# Patient Record
Sex: MALE | Race: WHITE | NOT HISPANIC OR LATINO | Employment: STUDENT | URBAN - METROPOLITAN AREA
[De-identification: names, ages, dates, MRNs, and addresses within clinical notes are randomized per-mention and may not be internally consistent; named-entity substitution may affect disease eponyms.]

---
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
## 2022-06-23 ENCOUNTER — OFFICE VISIT (OUTPATIENT)
Dept: FAMILY MEDICINE CLINIC | Facility: CLINIC | Age: 22
End: 2022-06-23

## 2022-06-23 VITALS
DIASTOLIC BLOOD PRESSURE: 62 MMHG | HEIGHT: 73 IN | BODY MASS INDEX: 23.62 KG/M2 | TEMPERATURE: 97.5 F | HEART RATE: 95 BPM | OXYGEN SATURATION: 98 % | WEIGHT: 178.2 LBS | SYSTOLIC BLOOD PRESSURE: 110 MMHG

## 2022-06-23 DIAGNOSIS — R53.83 OTHER FATIGUE: Primary | ICD-10-CM

## 2022-06-23 DIAGNOSIS — J45.21 MILD INTERMITTENT ASTHMA WITH EXACERBATION: ICD-10-CM

## 2022-06-23 DIAGNOSIS — U07.1 COVID-19 VIRUS INFECTION: ICD-10-CM

## 2022-06-23 PROCEDURE — 99213 OFFICE O/P EST LOW 20 MIN: CPT | Performed by: FAMILY MEDICINE

## 2022-06-23 NOTE — PROGRESS NOTES
"Answers for HPI/ROS submitted by the patient on 6/16/2022  What is the primary reason for your visit?: Physical    Chief Complaint  Follow-up (On covid. Tested positive 060622 ) and Fatigue    Subjective        Kvng See presents to [facility]  History of Present Illness    COVID-19  The patient reports he had COVID-19 on 06/06/2022. He states this is his first time having COVID-19. He states he thought he had strep throat and then his symptoms worsened. The patient that states he is still really fatigued, and he falls asleep easily. He states he went to bed last night around 10:00 PM and woke up today around 12:50 PM. He states he still has a lot of mucus, and has to clear his throat often. The patient states that his wheezing has been intermittent. He states he just came from his mother's house and she has a dog, and states he did not do well since he has not been around the dog in a long time. He states that he does not sleep well at night. He states he is working 2 days a week. The patient states that on 06/22/2022, he noticed his throat was a little red.     He states that other than the mucous and fatigue, he feels fine. He states his mood is pretty good, but whenever he gets tired and does not feel good, he gets upset at everything. He states it is difficult to take a deep breath.    Objective   Vital Signs:  /62   Pulse 95   Temp 97.5 °F (36.4 °C)   Ht 185.4 cm (73\")   Wt 80.8 kg (178 lb 3.2 oz)   SpO2 98%   BMI 23.51 kg/m²   Estimated body mass index is 23.51 kg/m² as calculated from the following:    Height as of this encounter: 185.4 cm (73\").    Weight as of this encounter: 80.8 kg (178 lb 3.2 oz).    BMI is within normal parameters. No other follow-up for BMI required.      Physical Exam  Constitutional:       General: He is not in acute distress.     Appearance: He is well-developed.   HENT:      Head: Normocephalic and atraumatic.      Right Ear: " Tympanic membrane, ear canal and external ear normal.      Left Ear: Tympanic membrane, ear canal and external ear normal.      Nose: Nose normal.      Mouth/Throat:      Mouth: Mucous membranes are moist.      Pharynx: Oropharynx is clear. Posterior oropharyngeal erythema present.      Comments: Lungs sound fine. Oxygen level is normal.  Slight erythema of the back of the throat.  Eyes:      Conjunctiva/sclera: Conjunctivae normal.      Pupils: Pupils are equal, round, and reactive to light.   Cardiovascular:      Rate and Rhythm: Normal rate and regular rhythm.      Heart sounds: No murmur heard.  Pulmonary:      Effort: Pulmonary effort is normal. No respiratory distress.      Breath sounds: Normal breath sounds.   Musculoskeletal:      Cervical back: Neck supple.      Right lower leg: No edema.      Left lower leg: No edema.   Lymphadenopathy:      Cervical: No cervical adenopathy.   Neurological:      Mental Status: He is alert and oriented to person, place, and time.   Psychiatric:         Mood and Affect: Mood normal.         Behavior: Behavior normal.        Result Review :                Assessment and Plan   Diagnoses and all orders for this visit:    1. Other fatigue (Primary)  -     Comprehensive Metabolic Panel  -     CBC & Differential  -     TSH    2. Mild intermittent asthma with exacerbation    3. COVID-19 virus infection             Follow Up   No follow-ups on file.  Patient was given instructions and counseling regarding his condition or for health maintenance advice. Please see specific information pulled into the AVS if appropriate.     Transcribed from ambient dictation for Meredith Lea Kehrer, MD by Fidelia Tracy.  06/23/22   14:43 EDT    Patient verbalized consent to the visit recording.

## 2022-06-24 LAB
ALBUMIN SERPL-MCNC: 5 G/DL (ref 4.1–5.2)
ALBUMIN/GLOB SERPL: 2.4 {RATIO} (ref 1.2–2.2)
ALP SERPL-CCNC: 84 IU/L (ref 51–125)
ALT SERPL-CCNC: 17 IU/L (ref 0–44)
AST SERPL-CCNC: 13 IU/L (ref 0–40)
BASOPHILS # BLD AUTO: 0.1 X10E3/UL (ref 0–0.2)
BASOPHILS NFR BLD AUTO: 1 %
BILIRUB SERPL-MCNC: 0.6 MG/DL (ref 0–1.2)
BUN SERPL-MCNC: 11 MG/DL (ref 6–20)
BUN/CREAT SERPL: 13 (ref 9–20)
CALCIUM SERPL-MCNC: 10.2 MG/DL (ref 8.7–10.2)
CHLORIDE SERPL-SCNC: 104 MMOL/L (ref 96–106)
CO2 SERPL-SCNC: 24 MMOL/L (ref 20–29)
CREAT SERPL-MCNC: 0.88 MG/DL (ref 0.76–1.27)
EGFRCR SERPLBLD CKD-EPI 2021: 127 ML/MIN/1.73
EOSINOPHIL # BLD AUTO: 1.3 X10E3/UL (ref 0–0.4)
EOSINOPHIL NFR BLD AUTO: 18 %
ERYTHROCYTE [DISTWIDTH] IN BLOOD BY AUTOMATED COUNT: 12.3 % (ref 11.6–15.4)
GLOBULIN SER CALC-MCNC: 2.1 G/DL (ref 1.5–4.5)
GLUCOSE SERPL-MCNC: 100 MG/DL (ref 65–99)
HCT VFR BLD AUTO: 46.6 % (ref 37.5–51)
HGB BLD-MCNC: 16.3 G/DL (ref 13–17.7)
IMM GRANULOCYTES # BLD AUTO: 0 X10E3/UL (ref 0–0.1)
IMM GRANULOCYTES NFR BLD AUTO: 1 %
LYMPHOCYTES # BLD AUTO: 1.6 X10E3/UL (ref 0.7–3.1)
LYMPHOCYTES NFR BLD AUTO: 23 %
MCH RBC QN AUTO: 31.6 PG (ref 26.6–33)
MCHC RBC AUTO-ENTMCNC: 35 G/DL (ref 31.5–35.7)
MCV RBC AUTO: 90 FL (ref 79–97)
MONOCYTES # BLD AUTO: 0.5 X10E3/UL (ref 0.1–0.9)
MONOCYTES NFR BLD AUTO: 7 %
NEUTROPHILS # BLD AUTO: 3.6 X10E3/UL (ref 1.4–7)
NEUTROPHILS NFR BLD AUTO: 50 %
PLATELET # BLD AUTO: 307 X10E3/UL (ref 150–450)
POTASSIUM SERPL-SCNC: 4.4 MMOL/L (ref 3.5–5.2)
PROT SERPL-MCNC: 7.1 G/DL (ref 6–8.5)
RBC # BLD AUTO: 5.16 X10E6/UL (ref 4.14–5.8)
SODIUM SERPL-SCNC: 141 MMOL/L (ref 134–144)
TSH SERPL DL<=0.005 MIU/L-ACNC: 1.88 UIU/ML (ref 0.45–4.5)
WBC # BLD AUTO: 7.1 X10E3/UL (ref 3.4–10.8)

## 2022-08-01 DIAGNOSIS — J45.20 MILD INTERMITTENT ASTHMA, UNSPECIFIED WHETHER COMPLICATED: ICD-10-CM

## 2022-08-01 RX ORDER — ALBUTEROL SULFATE 90 UG/1
2 AEROSOL, METERED RESPIRATORY (INHALATION) EVERY 4 HOURS PRN
Qty: 18 G | Refills: 2 | Status: SHIPPED | OUTPATIENT
Start: 2022-08-01

## 2022-08-01 NOTE — TELEPHONE ENCOUNTER
Caller: Kvgn See    Relationship: Self    Best call back number: [phone]     Requested Prescriptions:   Requested Prescriptions     Pending Prescriptions Disp Refills   • albuterol sulfate  (90 Base) MCG/ACT inhaler 18 g 2     Sig: Inhale 2 puffs Every 4 (Four) Hours As Needed for Wheezing.        [facility] [address] [address] [facility] AT [address] [phone] [facility] [phone]      Additional details provided by patient:     Does the patient have less than a 3 day supply:  [x] Yes  [] No    Yoanna Stevens Rep   08/01/22 14:47 EDT

## 2022-11-22 ENCOUNTER — TRANSCRIBE ORDERS (OUTPATIENT)
Dept: ADMINISTRATIVE | Facility: HOSPITAL | Age: 22
End: 2022-11-22

## 2022-11-22 DIAGNOSIS — J45.909: Primary | ICD-10-CM

## 2022-12-13 ENCOUNTER — HOSPITAL ENCOUNTER (OUTPATIENT)
Dept: RESPIRATORY THERAPY | Facility: HOSPITAL | Age: 22
Discharge: HOME | End: 2022-12-13

## 2022-12-13 ENCOUNTER — TRANSCRIBE ORDERS (OUTPATIENT)
Dept: ADMINISTRATIVE | Facility: HOSPITAL | Age: 22
End: 2022-12-13

## 2022-12-13 ENCOUNTER — LAB (OUTPATIENT)
Dept: LAB | Facility: HOSPITAL | Age: 22
End: 2022-12-13

## 2022-12-13 ENCOUNTER — HOSPITAL ENCOUNTER (OUTPATIENT)
Dept: GENERAL RADIOLOGY | Facility: HOSPITAL | Age: 22
Discharge: HOME | End: 2022-12-13

## 2022-12-13 DIAGNOSIS — J45.909 ACUTE ASTHMA: ICD-10-CM

## 2022-12-13 DIAGNOSIS — J45.909 ASTHMA, UNSPECIFIED ASTHMA SEVERITY, UNSPECIFIED WHETHER COMPLICATED, UNSPECIFIED WHETHER PERSISTENT: ICD-10-CM

## 2022-12-13 DIAGNOSIS — J45.909 ACUTE ASTHMA: Primary | ICD-10-CM

## 2022-12-13 DIAGNOSIS — J45.909: ICD-10-CM

## 2022-12-13 PROCEDURE — 94729 DIFFUSING CAPACITY: CPT

## 2022-12-13 PROCEDURE — 71046 X-RAY EXAM CHEST 2 VIEWS: CPT

## 2022-12-13 PROCEDURE — 82785 ASSAY OF IGE: CPT

## 2022-12-13 PROCEDURE — 94060 EVALUATION OF WHEEZING: CPT

## 2022-12-13 PROCEDURE — 36415 COLL VENOUS BLD VENIPUNCTURE: CPT

## 2022-12-13 PROCEDURE — 94727 GAS DIL/WSHOT DETER LNG VOL: CPT

## 2022-12-13 RX ORDER — ALBUTEROL SULFATE 2.5 MG/3ML
2.5 SOLUTION RESPIRATORY (INHALATION) ONCE AS NEEDED
Status: COMPLETED | OUTPATIENT
Start: 2022-12-13 | End: 2022-12-13

## 2022-12-13 RX ADMIN — ALBUTEROL SULFATE 2.5 MG: 2.5 SOLUTION RESPIRATORY (INHALATION) at 09:12

## 2022-12-16 LAB — IGE SERPL-ACNC: 256 IU/ML (ref 6–495)

## 2023-01-19 ENCOUNTER — OFFICE VISIT (OUTPATIENT)
Dept: FAMILY MEDICINE CLINIC | Facility: CLINIC | Age: 23
End: 2023-01-19
Payer: COMMERCIAL

## 2023-01-19 VITALS
OXYGEN SATURATION: 98 % | BODY MASS INDEX: 25.42 KG/M2 | HEIGHT: 73 IN | HEART RATE: 74 BPM | DIASTOLIC BLOOD PRESSURE: 70 MMHG | SYSTOLIC BLOOD PRESSURE: 122 MMHG | WEIGHT: 191.8 LBS | TEMPERATURE: 97.3 F

## 2023-01-19 DIAGNOSIS — J45.20 MILD INTERMITTENT ASTHMA, UNSPECIFIED WHETHER COMPLICATED: ICD-10-CM

## 2023-01-19 DIAGNOSIS — F90.2 ATTENTION DEFICIT HYPERACTIVITY DISORDER (ADHD), COMBINED TYPE: Primary | ICD-10-CM

## 2023-01-19 DIAGNOSIS — Z23 FLU VACCINE NEED: ICD-10-CM

## 2023-01-19 PROCEDURE — 90686 IIV4 VACC NO PRSV 0.5 ML IM: CPT | Performed by: FAMILY MEDICINE

## 2023-01-19 PROCEDURE — 90471 IMMUNIZATION ADMIN: CPT | Performed by: FAMILY MEDICINE

## 2023-01-19 PROCEDURE — 99213 OFFICE O/P EST LOW 20 MIN: CPT | Performed by: FAMILY MEDICINE

## 2023-01-19 RX ORDER — ATOMOXETINE 80 MG/1
80 CAPSULE ORAL DAILY
Qty: 30 CAPSULE | Refills: 1 | Status: SHIPPED | OUTPATIENT
Start: 2023-01-19 | End: 2023-03-02 | Stop reason: SDUPTHER

## 2023-01-19 RX ORDER — ATOMOXETINE 40 MG/1
40 CAPSULE ORAL DAILY
Qty: 3 CAPSULE | Refills: 0 | Status: SHIPPED | OUTPATIENT
Start: 2023-01-19 | End: 2023-01-22
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
## 2023-03-02 ENCOUNTER — OFFICE VISIT (OUTPATIENT)
Dept: FAMILY MEDICINE CLINIC | Facility: CLINIC | Age: 23
End: 2023-03-02
Payer: COMMERCIAL

## 2023-03-02 VITALS
OXYGEN SATURATION: 98 % | HEIGHT: 73 IN | TEMPERATURE: 98 F | WEIGHT: 190.6 LBS | SYSTOLIC BLOOD PRESSURE: 114 MMHG | BODY MASS INDEX: 25.26 KG/M2 | HEART RATE: 72 BPM | DIASTOLIC BLOOD PRESSURE: 68 MMHG

## 2023-03-02 DIAGNOSIS — J30.1 SEASONAL ALLERGIC RHINITIS DUE TO POLLEN: Primary | ICD-10-CM

## 2023-03-02 DIAGNOSIS — F90.2 ATTENTION DEFICIT HYPERACTIVITY DISORDER (ADHD), COMBINED TYPE: ICD-10-CM

## 2023-03-02 PROCEDURE — 99213 OFFICE O/P EST LOW 20 MIN: CPT | Performed by: FAMILY MEDICINE

## 2023-03-02 RX ORDER — ATOMOXETINE 80 MG/1
80 CAPSULE ORAL DAILY
Qty: 90 CAPSULE | Refills: 1 | Status: SHIPPED | OUTPATIENT
Start: 2023-03-02

## 2023-03-02 NOTE — PROGRESS NOTES
"Chief Complaint  ADD (Follow up ) and Fatigue (Meds making him sleepy )    Subjective        Kvng See presents to [facility]  History of Present Illness  Patient presents for 1 month follow-up on Strattera.  He thought it made him sleepy but on further discussion we realized that his sleep cycle that is the issue.  If he goes to bed at his normal time and gets up normally he is fine.  He also needs to make sure he takes it first thing in the morning.  His father and stepmom mother have noticed an improvement.  On further consideration he realizes that he has been able to complete some projects.  He still lacks motivation to do things he does not want to do which is normal.    He helped his grandfather do some vesna yesterday in a house that had a dog and his allergies have flared some.  He has not had any problems with his asthma yet this spring.  He is compliant with his inhaler.        Objective   Vital Signs:  /68   Pulse 72   Temp 98 °F (36.7 °C)   Ht 185.4 cm (73\")   Wt 86.5 kg (190 lb 9.6 oz)   SpO2 98%   BMI 25.15 kg/m²   Estimated body mass index is 25.15 kg/m² as calculated from the following:    Height as of this encounter: 185.4 cm (73\").    Weight as of this encounter: 86.5 kg (190 lb 9.6 oz).  Facility age limit for growth percentiles is 20 years.          Physical Exam  Constitutional:       General: He is not in acute distress.     Appearance: He is well-developed.   HENT:      Head: Normocephalic and atraumatic.      Right Ear: Tympanic membrane, ear canal and external ear normal.      Left Ear: Tympanic membrane, ear canal and external ear normal.      Nose: Rhinorrhea present.      Mouth/Throat:      Mouth: Mucous membranes are moist.      Pharynx: Oropharynx is clear.      Comments: Clear postnasal drainage  Eyes:      Conjunctiva/sclera: Conjunctivae normal.      Pupils: Pupils are equal, round, and reactive to light.   Cardiovascular:      " Rate and Rhythm: Normal rate and regular rhythm.      Heart sounds: No murmur heard.  Pulmonary:      Effort: Pulmonary effort is normal. No respiratory distress.      Breath sounds: Normal breath sounds.   Musculoskeletal:      Cervical back: Neck supple.      Right lower leg: No edema.      Left lower leg: No edema.   Lymphadenopathy:      Cervical: No cervical adenopathy.   Neurological:      Mental Status: He is alert and oriented to person, place, and time.   Psychiatric:         Mood and Affect: Mood normal.         Behavior: Behavior normal.        Result Review :                   Assessment and Plan   Diagnoses and all orders for this visit:    1. Seasonal allergic rhinitis due to pollen (Primary)    2. Attention deficit hyperactivity disorder (ADHD), combined type  -     atomoxetine (Strattera) 80 MG capsule; Take 1 capsule by mouth Daily.  Dispense: 90 capsule; Refill: 1    Attention deficit disorder-improved on Strattera, continue current dose and follow-up in 3 months for physical  Allergic rhinitis-continue Zyrtec and consider doing a steroid nasal spray over-the-counter         Follow Up   Return in about 3 months (around 6/2/2023) for Annual physical.  Patient was given instructions and counseling regarding his condition or for health maintenance advice. Please see specific information pulled into the AVS if appropriate.

## 2023-04-12 ENCOUNTER — OFFICE VISIT (OUTPATIENT)
Dept: FAMILY MEDICINE CLINIC | Facility: CLINIC | Age: 23
End: 2023-04-12
Payer: COMMERCIAL

## 2023-04-12 VITALS
OXYGEN SATURATION: 99 % | TEMPERATURE: 97.5 F | HEIGHT: 73 IN | WEIGHT: 191 LBS | DIASTOLIC BLOOD PRESSURE: 58 MMHG | BODY MASS INDEX: 25.31 KG/M2 | HEART RATE: 91 BPM | SYSTOLIC BLOOD PRESSURE: 110 MMHG

## 2023-04-12 DIAGNOSIS — F90.2 ATTENTION DEFICIT HYPERACTIVITY DISORDER (ADHD), COMBINED TYPE: Primary | ICD-10-CM

## 2023-04-12 PROCEDURE — 99213 OFFICE O/P EST LOW 20 MIN: CPT | Performed by: FAMILY MEDICINE

## 2023-04-12 RX ORDER — FLUTICASONE PROPIONATE 100 MCG
BLISTER, WITH INHALATION DEVICE INHALATION
COMMUNITY
Start: 2023-03-02

## 2023-04-12 RX ORDER — BUPROPION HYDROCHLORIDE 300 MG/1
300 TABLET ORAL DAILY
Qty: 30 TABLET | Refills: 0 | Status: SHIPPED | OUTPATIENT
Start: 2023-04-12

## 2023-04-12 RX ORDER — BUPROPION HYDROCHLORIDE 150 MG/1
150 TABLET ORAL DAILY
Qty: 7 TABLET | Refills: 0 | Status: SHIPPED | OUTPATIENT
Start: 2023-04-12 | End: 2023-04-19

## 2023-04-12 NOTE — PROGRESS NOTES
"Chief Complaint  Med Refill and ADD    Subjective        Kvng See presents to [facility] PRIMARY [facility]  History of Present Illness  When patient was here last month, he told me he was doing well on the Strattera but was tired.    He was really not having a good sleeping pattern.  He told me it did help with his focus but the fatigue has just continued.  He also has had some priapism but did not tell me about it.  He stopped the medicine a few days ago and is felt better.      Objective   Vital Signs:  /58   Pulse 91   Temp 97.5 °F (36.4 °C)   Ht 185.4 cm (73\")   Wt 86.6 kg (191 lb)   SpO2 99%   BMI 25.20 kg/m²   Estimated body mass index is 25.2 kg/m² as calculated from the following:    Height as of this encounter: 185.4 cm (73\").    Weight as of this encounter: 86.6 kg (191 lb).  Facility age limit for growth percentiles is 20 years.          Physical Exam  Constitutional:       General: He is not in acute distress.     Appearance: He is well-developed.   HENT:      Head: Normocephalic and atraumatic.   Eyes:      Conjunctiva/sclera: Conjunctivae normal.      Pupils: Pupils are equal, round, and reactive to light.   Pulmonary:      Effort: Pulmonary effort is normal. No respiratory distress.   Neurological:      Mental Status: He is alert and oriented to person, place, and time.   Psychiatric:         Mood and Affect: Mood normal.         Behavior: Behavior normal.        Result Review :                   Assessment and Plan   Diagnoses and all orders for this visit:    1. Attention deficit hyperactivity disorder (ADHD), combined type (Primary)  -     buPROPion XL (Wellbutrin XL) 150 MG 24 hr tablet; Take 1 tablet by mouth Daily for 7 days.  Dispense: 7 tablet; Refill: 0  -     buPROPion XL (Wellbutrin XL) 300 MG 24 hr tablet; Take 1 tablet by mouth Daily.  Dispense: 30 tablet; Refill: 0      ADHD- due to side effects on Strattera, will try Wellbutrin, would like to avoid " controlled substances       Follow Up   Return in about 1 month (around 5/12/2023) for Recheck add.  Patient was given instructions and counseling regarding his condition or for health maintenance advice. Please see specific information pulled into the AVS if appropriate.

## 2023-04-20 DIAGNOSIS — F90.2 ATTENTION DEFICIT HYPERACTIVITY DISORDER (ADHD), COMBINED TYPE: ICD-10-CM

## 2023-04-20 RX ORDER — BUPROPION HYDROCHLORIDE 150 MG/1
150 TABLET ORAL DAILY
Qty: 7 TABLET | Refills: 0 | OUTPATIENT
Start: 2023-04-20 | End: 2023-04-27

## 2023-05-13 DIAGNOSIS — F90.2 ATTENTION DEFICIT HYPERACTIVITY DISORDER (ADHD), COMBINED TYPE: ICD-10-CM

## 2023-05-15 ENCOUNTER — OFFICE VISIT (OUTPATIENT)
Dept: FAMILY MEDICINE CLINIC | Facility: CLINIC | Age: 23
End: 2023-05-15
Payer: COMMERCIAL

## 2023-05-15 VITALS
SYSTOLIC BLOOD PRESSURE: 110 MMHG | HEIGHT: 73 IN | WEIGHT: 191.8 LBS | OXYGEN SATURATION: 99 % | TEMPERATURE: 98.1 F | HEART RATE: 79 BPM | BODY MASS INDEX: 25.42 KG/M2 | DIASTOLIC BLOOD PRESSURE: 68 MMHG

## 2023-05-15 DIAGNOSIS — F90.2 ATTENTION DEFICIT HYPERACTIVITY DISORDER (ADHD), COMBINED TYPE: Primary | ICD-10-CM

## 2023-05-15 PROCEDURE — 99213 OFFICE O/P EST LOW 20 MIN: CPT | Performed by: FAMILY MEDICINE

## 2023-05-15 RX ORDER — BUPROPION HYDROCHLORIDE 300 MG/1
TABLET ORAL
Qty: 30 TABLET | Refills: 2 | OUTPATIENT
Start: 2023-05-15

## 2023-05-15 RX ORDER — BUPROPION HYDROCHLORIDE 300 MG/1
300 TABLET ORAL DAILY
Qty: 90 TABLET | Refills: 1 | Status: SHIPPED | OUTPATIENT
Start: 2023-05-15

## 2023-05-15 NOTE — PROGRESS NOTES
"Chief Complaint  ADD    Subjective        Kvng See presents to [facility] PRIMARY [facility]  History of Present Illness  Here for follow up.  Has not noticed big improvements.  Has been able to get more done.  Looking for a new job due to not getting enough hours.   Sleep and energy are better.           Objective   Vital Signs:  /68   Pulse 79   Temp 98.1 °F (36.7 °C)   Ht 185.4 cm (73\")   Wt 87 kg (191 lb 12.8 oz)   SpO2 99%   BMI 25.30 kg/m²   Estimated body mass index is 25.3 kg/m² as calculated from the following:    Height as of this encounter: 185.4 cm (73\").    Weight as of this encounter: 87 kg (191 lb 12.8 oz).  Facility age limit for growth percentiles is 20 years.          Physical Exam  Constitutional:       General: He is not in acute distress.     Appearance: He is well-developed.   HENT:      Head: Normocephalic and atraumatic.   Eyes:      Conjunctiva/sclera: Conjunctivae normal.      Pupils: Pupils are equal, round, and reactive to light.   Cardiovascular:      Rate and Rhythm: Normal rate and regular rhythm.   Pulmonary:      Effort: Pulmonary effort is normal. No respiratory distress.      Breath sounds: Normal breath sounds.   Neurological:      Mental Status: He is alert and oriented to person, place, and time.   Psychiatric:         Mood and Affect: Mood normal.         Behavior: Behavior normal.        Result Review :                   Assessment and Plan   Diagnoses and all orders for this visit:    1. Attention deficit hyperactivity disorder (ADHD), combined type (Primary)  -     buPROPion XL (Wellbutrin XL) 300 MG 24 hr tablet; Take 1 tablet by mouth Daily.  Dispense: 90 tablet; Refill: 1    ADD-doing well, refilled Wellbutrin and follow-up in a few months with a physical         Follow Up   Return in about 3 months (around 8/15/2023) for Recheck ADD, Annual physical.  Patient was given instructions and counseling regarding his condition or for health " maintenance advice. Please see specific information pulled into the AVS if appropriate.

## 2023-08-27 DIAGNOSIS — J45.20 MILD INTERMITTENT ASTHMA, UNSPECIFIED WHETHER COMPLICATED: ICD-10-CM

## 2023-08-27 DIAGNOSIS — F90.2 ATTENTION DEFICIT HYPERACTIVITY DISORDER (ADHD), COMBINED TYPE: ICD-10-CM

## 2023-08-28 RX ORDER — BUPROPION HYDROCHLORIDE 150 MG/1
150 TABLET ORAL DAILY
Qty: 7 TABLET | Refills: 0 | OUTPATIENT
Start: 2023-08-28 | End: 2023-09-04

## 2023-08-28 RX ORDER — ALBUTEROL SULFATE 90 UG/1
AEROSOL, METERED RESPIRATORY (INHALATION)
Qty: 8.5 G | OUTPATIENT
Start: 2023-08-28

## 2023-09-05 DIAGNOSIS — F90.2 ATTENTION DEFICIT HYPERACTIVITY DISORDER (ADHD), COMBINED TYPE: ICD-10-CM

## 2023-09-05 DIAGNOSIS — J45.20 MILD INTERMITTENT ASTHMA, UNSPECIFIED WHETHER COMPLICATED: ICD-10-CM

## 2023-09-05 RX ORDER — ALBUTEROL SULFATE 90 UG/1
2 AEROSOL, METERED RESPIRATORY (INHALATION) EVERY 4 HOURS PRN
Qty: 18 G | Refills: 2 | Status: SHIPPED | OUTPATIENT
Start: 2023-09-05

## 2023-09-05 RX ORDER — BUPROPION HYDROCHLORIDE 300 MG/1
300 TABLET ORAL DAILY
Qty: 90 TABLET | Refills: 1 | Status: SHIPPED | OUTPATIENT
Start: 2023-09-05

## 2023-09-05 NOTE — TELEPHONE ENCOUNTER
"Caller: Kvng See \"YOANA\"    Relationship: Self    Best call back number: [phone]    Requested Prescriptions:   Requested Prescriptions     Pending Prescriptions Disp Refills    albuterol sulfate  (90 Base) MCG/ACT inhaler 18 g 2     Sig: Inhale 2 puffs Every 4 (Four) Hours As Needed for Wheezing.    buPROPion XL (Wellbutrin XL) 300 MG 24 hr tablet 90 tablet 1     Sig: Take 1 tablet by mouth Daily.        Pharmacy where request should be sent: [facility] [id number] [place] [id number] [facility]  AT [address] [phone] [facility] [phone]      Last office visit with prescribing clinician: 5/15/2023   Last telemedicine visit with prescribing clinician: Visit date not found   Next office visit with prescribing clinician: 10/3/2023     Additional details provided by patient: REQUESTING EMERGENCY SCRIPT UNTIL NEXT VISIT    Does the patient have less than a 3 day supply:  [x] Yes  [] No      Yoanna Wiseman Rep   09/05/23 12:34 EDT       "

## 2023-10-03 ENCOUNTER — OFFICE VISIT (OUTPATIENT)
Dept: FAMILY MEDICINE CLINIC | Facility: CLINIC | Age: 23
End: 2023-10-03
Payer: COMMERCIAL

## 2023-10-03 VITALS
SYSTOLIC BLOOD PRESSURE: 108 MMHG | BODY MASS INDEX: 25.55 KG/M2 | OXYGEN SATURATION: 98 % | WEIGHT: 192.8 LBS | TEMPERATURE: 98.5 F | DIASTOLIC BLOOD PRESSURE: 54 MMHG | HEIGHT: 73 IN | HEART RATE: 76 BPM

## 2023-10-03 DIAGNOSIS — F90.2 ATTENTION DEFICIT HYPERACTIVITY DISORDER (ADHD), COMBINED TYPE: Primary | ICD-10-CM

## 2023-10-03 DIAGNOSIS — J45.20 MILD INTERMITTENT ASTHMA, UNSPECIFIED WHETHER COMPLICATED: ICD-10-CM

## 2023-10-03 PROCEDURE — 99213 OFFICE O/P EST LOW 20 MIN: CPT | Performed by: FAMILY MEDICINE

## 2023-10-03 RX ORDER — BUPROPION HYDROCHLORIDE 300 MG/1
300 TABLET ORAL DAILY
Qty: 90 TABLET | Refills: 1 | Status: SHIPPED | OUTPATIENT
Start: 2023-10-03

## 2023-10-03 NOTE — PROGRESS NOTES
"Chief Complaint  Med Refill and ADD    Subjective        Kvng See presents to [facility]  History of Present Illness  Patient presents to follow-up on his attention deficit disorder and mood.  He feels the Wellbutrin is still really helping him.  His mood is improved and his concentration and focus are better.  He feels he is doing okay at work.  He has not had any flares of his asthma.        Objective   Vital Signs:  /54   Pulse 76   Temp 98.5 °F (36.9 °C)   Ht 185.4 cm (73\")   Wt 87.5 kg (192 lb 12.8 oz)   SpO2 98%   BMI 25.44 kg/m²   Estimated body mass index is 25.44 kg/m² as calculated from the following:    Height as of this encounter: 185.4 cm (73\").    Weight as of this encounter: 87.5 kg (192 lb 12.8 oz).             Physical Exam  Constitutional:       General: He is not in acute distress.     Appearance: He is well-developed.   HENT:      Head: Normocephalic and atraumatic.      Right Ear: Tympanic membrane, ear canal and external ear normal.      Left Ear: Tympanic membrane, ear canal and external ear normal.      Nose: Nose normal.      Mouth/Throat:      Mouth: Mucous membranes are moist.      Pharynx: Oropharynx is clear.   Eyes:      Conjunctiva/sclera: Conjunctivae normal.      Pupils: Pupils are equal, round, and reactive to light.   Cardiovascular:      Rate and Rhythm: Normal rate and regular rhythm.      Heart sounds: No murmur heard.  Pulmonary:      Effort: Pulmonary effort is normal. No respiratory distress.      Breath sounds: Normal breath sounds.   Musculoskeletal:      Cervical back: Neck supple.      Right lower leg: No edema.      Left lower leg: No edema.   Lymphadenopathy:      Cervical: No cervical adenopathy.   Neurological:      Mental Status: He is alert and oriented to person, place, and time.   Psychiatric:         Mood and Affect: Mood normal.         Behavior: Behavior normal.      Result Review :                 "   Assessment and Plan   Diagnoses and all orders for this visit:    1. Attention deficit hyperactivity disorder (ADHD), combined type (Primary)  -     buPROPion XL (Wellbutrin XL) 300 MG 24 hr tablet; Take 1 tablet by mouth Daily.  Dispense: 90 tablet; Refill: 1    2. Mild intermittent asthma, unspecified whether complicated    ADHD-continue Wellbutrin and follow-up for annual  Asthma-albuterol urine         Follow Up   Return in about 6 months (around 4/3/2024) for Annual physical, Recheck.  Patient was given instructions and counseling regarding his condition or for health maintenance advice. Please see specific information pulled into the AVS if appropriate.

## 2024-04-03 ENCOUNTER — OFFICE VISIT (OUTPATIENT)
Dept: FAMILY MEDICINE CLINIC | Facility: CLINIC | Age: 24
End: 2024-04-03
Payer: COMMERCIAL

## 2024-04-03 VITALS
TEMPERATURE: 97.7 F | HEIGHT: 73 IN | SYSTOLIC BLOOD PRESSURE: 104 MMHG | WEIGHT: 203 LBS | OXYGEN SATURATION: 96 % | HEART RATE: 80 BPM | DIASTOLIC BLOOD PRESSURE: 64 MMHG | BODY MASS INDEX: 26.9 KG/M2

## 2024-04-03 DIAGNOSIS — Z00.00 HEALTHCARE MAINTENANCE: Primary | ICD-10-CM

## 2024-04-03 DIAGNOSIS — Z23 NEED FOR TDAP VACCINATION: ICD-10-CM

## 2024-04-03 DIAGNOSIS — J45.20 MILD INTERMITTENT ASTHMA, UNSPECIFIED WHETHER COMPLICATED: ICD-10-CM

## 2024-04-03 DIAGNOSIS — F90.2 ATTENTION DEFICIT HYPERACTIVITY DISORDER (ADHD), COMBINED TYPE: ICD-10-CM

## 2024-04-03 DIAGNOSIS — J30.1 SEASONAL ALLERGIC RHINITIS DUE TO POLLEN: ICD-10-CM

## 2024-04-03 DIAGNOSIS — J45.20 MILD INTERMITTENT ASTHMA WITHOUT COMPLICATION: ICD-10-CM

## 2024-04-03 RX ORDER — ALBUTEROL SULFATE 90 UG/1
2 AEROSOL, METERED RESPIRATORY (INHALATION) EVERY 4 HOURS PRN
Qty: 18 G | Refills: 5 | Status: SHIPPED | OUTPATIENT
Start: 2024-04-03

## 2024-04-03 RX ORDER — MONTELUKAST SODIUM 10 MG/1
10 TABLET ORAL NIGHTLY
Qty: 90 TABLET | Refills: 3 | Status: SHIPPED | OUTPATIENT
Start: 2024-04-03

## 2024-04-03 NOTE — PROGRESS NOTES
Subjective   Kvng See is a 23 y.o. male who presents for annual male wellness exam.  Chief Complaint   Patient presents with    Annual Exam        History of Present Illness  The patient is a 23-year-old male who presents for physical exam.    The patient reports experiencing a sore throat since the previous night, which he attributes to an unwell individual at his workplace. He denies any associated fever.    The patient's allergies are currently well-managed. However, he reports a recent exacerbation of his asthma symptoms, particularly nocturnal. He has not been prescribed Flovent and has not recently consulted with an allergist. His current medication regimen includes Zyrtec and albuterol as needed. His inhaler usage is inconsistent, with frequent nocturnal awakenings, a marked increase from his previous usage of bi-weekly to thrice weekly.    The patient discontinued bupropion due to perceived lack of noticeable improvement. However, he reports improved functionality without it. He acknowledges improved memory, particularly in relation to minor tasks, but finds getting into new habits challenging. Currently, he perceives that bupropion is not necessary as it does not significantly impact his job. He acknowledges that engaging in leisure activities such as laundry has been negatively impacting his cognitive abilities.   He is living with his girlfriend and a friend.       Sexual History: yes, 1 partner of the past 2 years   Contraception: partner on OCPs, and uses condoms  Diet: standard  Exercise: no  Last dental exam: due  Eye exam: up to date    Colon Cancer Screening: na  PSA: na      Immunization History   Administered Date(s) Administered    COVID-19 (PFIZER) Purple Cap Monovalent 04/10/2021, 05/02/2021, 01/09/2022    DTaP 2002, 2002, 04/17/2003, 03/02/2004, 08/31/2006    Flu Vaccine Intradermal Quad 18-64YR 01/10/2019    Flu Vaccine Split Quad 01/10/2019    Fluzone (or Fluarix &  Flulaval for VFC) >6mos 10/15/2021, 01/19/2023    HPV Bivalent 11/09/2016, 01/31/2017    Hep A, 2 Dose 07/30/2018    Hepatitis A 01/30/2018, 07/30/2018    Hepatitis B Adult/Adolescent IM 2002, 2002, 04/18/2003    HiB 2002, 2002, 04/18/2003, 03/02/2004    IPV 2002, 2002, 04/18/2003, 08/31/2006    Influenza Injectable Mdck Pf Quad 01/19/2023    Influenza MDCK Quadrivalent with Preserative 01/10/2019    MMR 03/02/2004, 08/31/2006    Meningococcal MCV4P (Menactra) 07/30/2018    Meningococcal Polysaccharide 07/30/2018    Pneumococcal Conjugate 13-Valent (PCV13) 2002, 04/18/2003, 07/18/2003, 03/02/2004    Varicella 07/18/2003, 10/01/2010       The following portions of the patient's history were reviewed and updated as appropriate: allergies, current medications, past family history, past medical history, past social history, past surgical history and problem list.    Past Medical History:   Diagnosis Date    Acne     Acute upper respiratory infection     ADHD (attention deficit hyperactivity disorder)     Allergic rhinitis     Asthma     Attention deficit disorder without hyperactivity     Cough     Esophageal reflux     Fever     Long-term use of high-risk medication     Need for hepatitis A immunization     Need for influenza vaccination     Need for meningococcal vaccination     Need for prophylactic vaccination against human papillomavirus     Rhinorrhea     Sore throat     Sports physical     Status asthmaticus     Wheezing        Past Surgical History:   Procedure Laterality Date    KNEE ARTHROSCOPY Left     KNEE SURGERY         Family History   Problem Relation Age of Onset    Asthma Mother     Allergic rhinitis Mother     Allergic rhinitis Father     No Known Problems Other        Social History     Socioeconomic History    Marital status: Single   Tobacco Use    Smoking status: Never    Smokeless tobacco: Never   Vaping Use    Vaping status: Never Used   Substance and  Sexual Activity    Alcohol use: No    Drug use: No    Sexual activity: Yes     Partners: Female         Current Outpatient Medications:     albuterol sulfate  (90 Base) MCG/ACT inhaler, Inhale 2 puffs Every 4 (Four) Hours As Needed for Wheezing., Disp: 18 g, Rfl: 5    cetirizine (zyrTEC) 10 MG tablet, Take 1 tablet by mouth Daily., Disp: , Rfl:     montelukast (Singulair) 10 MG tablet, Take 1 tablet by mouth Every Night., Disp: 90 tablet, Rfl: 3    Review of Systems    Objective   Vitals:    04/03/24 1433   BP: 104/64   Pulse: 80   Temp: 97.7 °F (36.5 °C)   SpO2: 96%     Body mass index is 26.79 kg/m².  Physical Exam  Vitals and nursing note reviewed.   Constitutional:       General: He is not in acute distress.     Appearance: Normal appearance. He is well-developed.   HENT:      Head: Normocephalic and atraumatic.      Right Ear: Tympanic membrane, ear canal and external ear normal.      Left Ear: Tympanic membrane, ear canal and external ear normal.      Nose: Nose normal. Rhinorrhea present.      Comments: Nares pale boggy     Mouth/Throat:      Mouth: Mucous membranes are moist.      Pharynx: Oropharynx is clear. Posterior oropharyngeal erythema present.      Comments: Normal erythema of throat but clear postnasal drainage  Eyes:      Conjunctiva/sclera: Conjunctivae normal.      Pupils: Pupils are equal, round, and reactive to light.   Neck:      Thyroid: No thyromegaly.   Cardiovascular:      Rate and Rhythm: Normal rate and regular rhythm.      Heart sounds: No murmur heard.  Pulmonary:      Effort: Pulmonary effort is normal.      Breath sounds: Normal breath sounds.   Abdominal:      General: Abdomen is flat. Bowel sounds are normal. There is no distension.      Palpations: Abdomen is soft. There is no mass.   Musculoskeletal:         General: No swelling or tenderness. Normal range of motion.      Cervical back: Neck supple.   Skin:     General: Skin is warm and dry.      Capillary Refill:  Capillary refill takes less than 2 seconds.   Neurological:      Mental Status: He is alert and oriented to person, place, and time.   Psychiatric:         Mood and Affect: Mood normal.         Behavior: Behavior normal.       Physical Exam         Results         Assessment & Plan   Diagnoses and all orders for this visit:    1. Healthcare maintenance (Primary)  -     Chlamydia trachomatis, Neisseria gonorrhoeae, PCR - Urine, Urine, Clean Catch    2. Attention deficit hyperactivity disorder (ADHD), combined type    3. Mild intermittent asthma without complication  -     montelukast (Singulair) 10 MG tablet; Take 1 tablet by mouth Every Night.  Dispense: 90 tablet; Refill: 3    4. Need for Tdap vaccination  -     Tdap Vaccine Greater Than or Equal To 6yo IM    5. Seasonal allergic rhinitis due to pollen  -     montelukast (Singulair) 10 MG tablet; Take 1 tablet by mouth Every Night.  Dispense: 90 tablet; Refill: 3    6. Mild intermittent asthma, unspecified whether complicated  -     albuterol sulfate  (90 Base) MCG/ACT inhaler; Inhale 2 puffs Every 4 (Four) Hours As Needed for Wheezing.  Dispense: 18 g; Refill: 5      Assessment & Plan  1. Asthma.  Will add in Singulair to see if it helps.  Let me know if keep using rescue inhaler more than twice a week.    2. Attention Deficit Disorder (ADD).  The patient was informed that his condition will improve with age.  He is learning skills to manage without medication and would like to stay off medication for now.    Health maintenance  Defers other immunizations except 1 for now.  Will do Tdap and get him at the next time.       Discussed the importance of maintaining a healthy weight and getting regular exercise.  Educated patient on the benefits of healthy diet.  Advise follow-up annually for wellness exams.    There are no Patient Instructions on file for this visit.    Patient or patient representative verbalized consent for the use of Ambient Listening during  the visit with  Meredith Lea Kehrer, MD for chart documentation. 4/3/2024  14:49 EDT

## 2024-04-05 LAB
C TRACH RRNA SPEC QL NAA+PROBE: NEGATIVE
N GONORRHOEA RRNA SPEC QL NAA+PROBE: NEGATIVE

## 2024-08-22 ENCOUNTER — PREP FOR SURGERY (OUTPATIENT)
Dept: SURGERY | Facility: SURGERY CENTER | Age: 24
End: 2024-08-22
Payer: COMMERCIAL

## 2024-08-22 ENCOUNTER — OFFICE VISIT (OUTPATIENT)
Dept: GASTROENTEROLOGY | Facility: CLINIC | Age: 24
End: 2024-08-22
Payer: COMMERCIAL

## 2024-08-22 VITALS
HEART RATE: 82 BPM | BODY MASS INDEX: 27.32 KG/M2 | TEMPERATURE: 98.1 F | SYSTOLIC BLOOD PRESSURE: 118 MMHG | WEIGHT: 206.1 LBS | DIASTOLIC BLOOD PRESSURE: 60 MMHG | HEIGHT: 73 IN | OXYGEN SATURATION: 98 %

## 2024-08-22 DIAGNOSIS — R19.7 DIARRHEA, UNSPECIFIED TYPE: ICD-10-CM

## 2024-08-22 DIAGNOSIS — R12 HEARTBURN: ICD-10-CM

## 2024-08-22 DIAGNOSIS — R13.19 ESOPHAGEAL DYSPHAGIA: Primary | ICD-10-CM

## 2024-08-22 DIAGNOSIS — R19.4 CHANGE IN BOWEL HABITS: ICD-10-CM

## 2024-08-22 DIAGNOSIS — R11.2 NAUSEA AND VOMITING, UNSPECIFIED VOMITING TYPE: ICD-10-CM

## 2024-08-22 PROCEDURE — 99204 OFFICE O/P NEW MOD 45 MIN: CPT | Performed by: NURSE PRACTITIONER

## 2024-08-22 RX ORDER — SODIUM CHLORIDE 0.9 % (FLUSH) 0.9 %
10 SYRINGE (ML) INJECTION AS NEEDED
OUTPATIENT
Start: 2024-08-22

## 2024-08-22 RX ORDER — SODIUM CHLORIDE, SODIUM LACTATE, POTASSIUM CHLORIDE, CALCIUM CHLORIDE 600; 310; 30; 20 MG/100ML; MG/100ML; MG/100ML; MG/100ML
30 INJECTION, SOLUTION INTRAVENOUS CONTINUOUS PRN
OUTPATIENT
Start: 2024-08-22

## 2024-08-22 RX ORDER — SODIUM CHLORIDE 0.9 % (FLUSH) 0.9 %
3 SYRINGE (ML) INJECTION EVERY 12 HOURS SCHEDULED
OUTPATIENT
Start: 2024-08-22

## 2024-09-20 ENCOUNTER — LAB REQUISITION (OUTPATIENT)
Dept: LAB | Facility: HOSPITAL | Age: 24
End: 2024-09-20
Payer: COMMERCIAL

## 2024-09-20 ENCOUNTER — OUTSIDE FACILITY SERVICE (OUTPATIENT)
Dept: GASTROENTEROLOGY | Facility: CLINIC | Age: 24
End: 2024-09-20
Payer: COMMERCIAL

## 2024-09-20 DIAGNOSIS — K26.9 DUODENAL ULCER, UNSPECIFIED AS ACUTE OR CHRONIC, WITHOUT HEMORRHAGE OR PERFORATION: ICD-10-CM

## 2024-09-20 PROCEDURE — 43239 EGD BIOPSY SINGLE/MULTIPLE: CPT | Performed by: INTERNAL MEDICINE

## 2024-09-20 PROCEDURE — 88305 TISSUE EXAM BY PATHOLOGIST: CPT | Performed by: INTERNAL MEDICINE

## 2024-09-20 PROCEDURE — 43249 ESOPH EGD DILATION <30 MM: CPT | Performed by: INTERNAL MEDICINE

## 2024-09-23 ENCOUNTER — PATIENT MESSAGE (OUTPATIENT)
Dept: GASTROENTEROLOGY | Facility: CLINIC | Age: 24
End: 2024-09-23
Payer: COMMERCIAL

## 2024-09-25 LAB
CYTO UR: NORMAL
LAB AP CASE REPORT: NORMAL
PATH REPORT.ADDENDUM SPEC: NORMAL
PATH REPORT.FINAL DX SPEC: NORMAL
PATH REPORT.GROSS SPEC: NORMAL

## 2024-09-30 DIAGNOSIS — K20.0 ESOPHAGITIS, EOSINOPHILIC: Primary | ICD-10-CM

## 2024-09-30 DIAGNOSIS — K26.9 DUODENAL ULCER: ICD-10-CM

## 2024-09-30 RX ORDER — PANTOPRAZOLE SODIUM 40 MG/1
40 TABLET, DELAYED RELEASE ORAL 2 TIMES DAILY
Qty: 60 TABLET | Refills: 5 | Status: SHIPPED | OUTPATIENT
Start: 2024-09-30

## 2024-11-16 NOTE — PROGRESS NOTES
Chief Complaint  Abdominal Pain and GI Problem    Subjective         History of Present Illness  Alton is a 24-year-old male who presents today for a follow-up visit and was last seen in this practice on 08/22/2024 for nausea and vomiting, at which time an EGD was recommended. He is accompanied by his father to discuss EGD result completed on 09/2024.     Previous visit, patient was experiencing some loose stool with abdominal pain and he was given orders for stool studies, however he reports some gradual improvement in his condition so he did not complete stool studies as advised.  No longer having abnormal bowel movement or abdominal pain associates with diarrhea episodes.  Bowel movements returned to normal baseline. He reports no presence of blood in his stool and has normal bowel movements.     Dysphagia  GERD  Upper abdominal pain    He reports that he still experiences choking, reflux, and difficulty swallowing. These symptoms improved for a few days post-procedure but have since returned, although slightly better than before.    He experiences intermittent abdominal pain, which he attributes to a stomach ulcer, and notes that it varies depending on his diet. He does not frequently use ibuprofen or Advil. He was prescribed pantoprazole 40 mg twice daily but discontinued it after 1.5 weeks due to nausea and stomach pain. He experiences abdominal pain after consuming fast food, which he believes his stomach cannot tolerate.     SOCIAL HISTORY  The patient denies smoking or drinking alcohol.    FAMILY HISTORY  He denies any family history of colon cancer. He has a family history of esophageal issues.    Endoscopy, Int (09/20/2024)   EGD impression:  Benign appearing esophageal stenosis.  Dilated  Esophageal mucosal changes suspicious for eosinophilic esophagitis  Normal stomach  Nonbleeding duodenal ulcer    Tissue Pathology Exam (09/20/2024 12:00)   Duodenal biopsy showed eroded mucosa with detached  "necroinflammatory material suggestive of ulceration.  Acute duodenitis  Esophagus biopsy showed eosinophilic esophagitis  Recommend PPI twice daily and if not responding, consider swallow steroid.  Also consider referral to allergist for further allergy/hypersensitivity testing    Objective   Vital Signs:  /60   Pulse 87   Temp 97.9 °F (36.6 °C)   Ht 185.4 cm (73\")   Wt 93.2 kg (205 lb 6.4 oz)   SpO2 98%   BMI 27.10 kg/m²   Estimated body mass index is 27.1 kg/m² as calculated from the following:    Height as of this encounter: 185.4 cm (73\").    Weight as of this encounter: 93.2 kg (205 lb 6.4 oz).      Physical Exam  Vitals and nursing note reviewed.   Constitutional:       General: He is not in acute distress.     Appearance: Normal appearance. He is normal weight. He is not ill-appearing, toxic-appearing or diaphoretic.   Eyes:      General: No scleral icterus.     Conjunctiva/sclera: Conjunctivae normal.   Cardiovascular:      Rate and Rhythm: Normal rate and regular rhythm.      Pulses: Normal pulses.      Heart sounds: Normal heart sounds.   Pulmonary:      Effort: Pulmonary effort is normal. No respiratory distress.      Breath sounds: Normal breath sounds. No stridor.   Abdominal:      General: Abdomen is flat. Bowel sounds are normal. There is no distension.      Palpations: Abdomen is soft. There is no mass.      Tenderness: There is no abdominal tenderness. There is no right CVA tenderness, left CVA tenderness, guarding or rebound.      Hernia: No hernia is present.   Skin:     Coloration: Skin is not jaundiced or pale.      Findings: No erythema or rash.   Neurological:      Mental Status: He is alert and oriented to person, place, and time. Mental status is at baseline.   Psychiatric:         Mood and Affect: Mood normal.            Assessment and Plan     Diagnoses and all orders for this visit:    1. Esophagitis, eosinophilic (Primary)  -     pantoprazole (PROTONIX) 40 MG EC tablet; Take " 1 tablet by mouth 2 (Two) Times a Day for 60 days. Take 2 times daily for at least 8 weeks.  Dispense: 60 tablet; Refill: 1    2. Duodenitis determined by biopsy    3. Esophageal stenosis    4. Duodenal ulcer  -     pantoprazole (PROTONIX) 40 MG EC tablet; Take 1 tablet by mouth 2 (Two) Times a Day for 60 days. Take 2 times daily for at least 8 weeks.  Dispense: 60 tablet; Refill: 1    Other orders  -     fluticasone (FLOVENT HFA) 220 MCG/ACT inhaler; 2 puffs SWALLOWED twice daily for eosinophilic esophagitis.  Spray the aerosol inhaler (without a spacer) into the throat Do not inhale when dose is being delivered. instead, swallow the accumulated liquid. Do not eat or drink for 30 minutes after administration,  Dispense: 1 each; Refill: 3  -     sucralfate (CARAFATE) 1 g tablet; Take 1 tablet by mouth 2 (Two) Times a Day.  Dispense: 60 tablet; Refill: 1       DISCUSSION  Patient presents to our practice today for the following concerns:  Assessment & Plan  1. Esophageal stenosis.  2.  Dysphagia  3.  Eosinophilic esophagitis  4.  GERD  5.  Acute duodenitis  The patient's difficulty swallowing and acid reflux are indicative of esophageal stenosis. The endoscopy revealed a narrowing of the esophagus, which was subsequently dilated.   Pantoprazole 40 mg twice daily for 8 weeks will be reinitiated, with a plan to taper the dosage to once daily thereafter.   Sucralfate was also prescribed to be taken 30 minutes to an hour before meals for 2 weeks.   Dietary modifications were recommended, including avoiding greasy, spicy, and citrus foods, as well as beer.   The biopsy revealed eosinophilic esophagitis, suggesting potential allergies that could exacerbate his symptoms.   A steroid inhaler was prescribed to be used as directed.   A referral to an allergist was made to identify potential allergens and manage the condition.    Follow-up  Return in 10 weeks for follow-up.     Patient was given instructions and counseling  regarding his condition or for health maintenance advice. Please see specific information pulled into the AVS if appropriate.     Patient or patient representative verbalized consent for the use of Ambient Listening during the visit with  ESTEPHANIA Ramos for chart documentation. 11/19/2024  10:52 EST

## 2024-11-19 ENCOUNTER — OFFICE VISIT (OUTPATIENT)
Dept: GASTROENTEROLOGY | Facility: CLINIC | Age: 24
End: 2024-11-19
Payer: COMMERCIAL

## 2024-11-19 VITALS
WEIGHT: 205.4 LBS | TEMPERATURE: 97.9 F | OXYGEN SATURATION: 98 % | HEART RATE: 87 BPM | DIASTOLIC BLOOD PRESSURE: 60 MMHG | BODY MASS INDEX: 27.22 KG/M2 | SYSTOLIC BLOOD PRESSURE: 104 MMHG | HEIGHT: 73 IN

## 2024-11-19 DIAGNOSIS — K29.80 DUODENITIS DETERMINED BY BIOPSY: ICD-10-CM

## 2024-11-19 DIAGNOSIS — K26.9 DUODENAL ULCER: ICD-10-CM

## 2024-11-19 DIAGNOSIS — K22.2 ESOPHAGEAL STENOSIS: ICD-10-CM

## 2024-11-19 DIAGNOSIS — K20.0 ESOPHAGITIS, EOSINOPHILIC: Primary | ICD-10-CM

## 2024-11-19 PROCEDURE — 99214 OFFICE O/P EST MOD 30 MIN: CPT | Performed by: NURSE PRACTITIONER

## 2024-11-19 RX ORDER — FLUTICASONE PROPIONATE 220 UG/1
AEROSOL, METERED RESPIRATORY (INHALATION)
Qty: 1 EACH | Refills: 3 | Status: SHIPPED | OUTPATIENT
Start: 2024-11-19

## 2024-11-19 RX ORDER — SUCRALFATE 1 G/1
1 TABLET ORAL 2 TIMES DAILY
Qty: 60 TABLET | Refills: 1 | Status: SHIPPED | OUTPATIENT
Start: 2024-11-19

## 2024-11-19 RX ORDER — PANTOPRAZOLE SODIUM 40 MG/1
40 TABLET, DELAYED RELEASE ORAL 2 TIMES DAILY
Qty: 60 TABLET | Refills: 1 | Status: SHIPPED | OUTPATIENT
Start: 2024-11-19 | End: 2025-01-18

## 2024-11-19 NOTE — PATIENT INSTRUCTIONS
GERD is a chronic disease that occurs when stomach acid flows back into the tube that connects your mouth and stomach. Warning of worsening symptoms may include: Hoarseness, trouble swallowing, too much throat mucus, a lump in the throat, chronic cough, and heartburn. Some conservative measures or treatment may help, which include:  Diet and lifestyle modification: avoid greasy foods or any foods that will cause heartburn for example chocolate, mint, spicy foods, tomato based products, and citrus. Avoid alcohol, tobacco, and caffeine. Avoid late night heavy meals. Avoid bending forward or stooping after eating. Sleep with head of your bed raised 6-8 inches.  You may also try Apple Cider Vinegar, 2 teaspoons in 8 ounces of water 3 times a day. Take 30 minutes before meals or after meals and rinse mouth after each use.   If difficulty swallowing occur, I recommend other supportive care measures, including thicken liquids to avoid aspiration. Eat smaller meals at different time intervals and cut into smaller pieces, take sips of water in between. If the above symptoms do not improve, please contact our office for further evaluation or as plan has discussed.   Continue PPI therapy at this time as it has been effective for GERD symptoms control. Be aware that potential risks associate with long-term (>6 months) PPI therapy may include but not limited to vitamin deficiencies or alteration in vitamin metabolism, such as iron, b12, d, calcium, and magnesium, as well as risks for gastric polyps and C. Difficile. Take additional vitamin supplements as we've discussed today.

## 2025-04-07 ENCOUNTER — OFFICE VISIT (OUTPATIENT)
Dept: FAMILY MEDICINE CLINIC | Facility: CLINIC | Age: 25
End: 2025-04-07
Payer: COMMERCIAL

## 2025-04-07 VITALS
DIASTOLIC BLOOD PRESSURE: 60 MMHG | HEIGHT: 73 IN | TEMPERATURE: 97.9 F | HEART RATE: 84 BPM | BODY MASS INDEX: 26.35 KG/M2 | SYSTOLIC BLOOD PRESSURE: 106 MMHG | OXYGEN SATURATION: 99 % | WEIGHT: 198.8 LBS

## 2025-04-07 DIAGNOSIS — J30.1 SEASONAL ALLERGIC RHINITIS DUE TO POLLEN: ICD-10-CM

## 2025-04-07 DIAGNOSIS — K20.0 EOSINOPHILIC ESOPHAGITIS: ICD-10-CM

## 2025-04-07 DIAGNOSIS — Z11.3 SCREENING FOR STD (SEXUALLY TRANSMITTED DISEASE): ICD-10-CM

## 2025-04-07 DIAGNOSIS — Z00.00 HEALTHCARE MAINTENANCE: Primary | ICD-10-CM

## 2025-04-07 DIAGNOSIS — J45.20 MILD INTERMITTENT ASTHMA, UNSPECIFIED WHETHER COMPLICATED: ICD-10-CM

## 2025-04-07 PROCEDURE — 99395 PREV VISIT EST AGE 18-39: CPT | Performed by: FAMILY MEDICINE

## 2025-04-07 RX ORDER — ALBUTEROL SULFATE 90 UG/1
2 INHALANT RESPIRATORY (INHALATION) EVERY 4 HOURS PRN
Qty: 18 G | Refills: 5 | Status: SHIPPED | OUTPATIENT
Start: 2025-04-07

## 2025-04-07 NOTE — PROGRESS NOTES
Subjective   Kvng See is a 24 y.o. male who presents for annual male wellness exam.  Chief Complaint   Patient presents with    Annual Exam        History of Present Illness  The patient is a 24-year-old male who presents for an annual exam and follow-up of eosinophilic esophagitis, asthma, acne, and ADD.    He has been diagnosed with eosinophilic esophagitis by a gastroenterologist. He reports significant improvement in his esophageal condition following an 8-week course of proton pump inhibitors. He has implemented dietary modifications and increased physical activity, which have contributed to his overall well-being. He experiences fewer episodes of choking and adverse stomach reactions to food, except when consuming certain foods like [employer] chicken. He expresses interest in undergoing food allergy testing due to a suspected reaction to a specific salad dressing. He plans to consult an allergist for food allergy testing. He is not currently taking Singulair (montelukast) but uses an inhaler and over-the-counter allergy medications as needed.    His asthma is well-managed, with infrequent use of his inhaler, the last instance being a month ago and prior to that, a few months ago. He requires a refill of his albuterol prescription.    He reports severe acne breakouts on his back, which he attributes to excessive sweating at work. He does not use any specific acne body wash.    He has observed that certain foods trigger diarrhea, and missing breakfast leads to frequent bathroom visits. He has not undergone food allergy testing.    He has a history of ADD but does not wish to resume medication as he believes it did not significantly impact his condition.    MEDICATIONS  Current: albuterol inhaler  Discontinued: montelukast       Sexual History: 1 partner of 3 years  Contraception: condoms, OCPs  Diet: trying to eat healthy and avoid fast food  Exercise: yes, works at a [employer]  Do you feel  safe? yes  Have you ever been abused? no  Last dental exam: up to date  Eye exam: up to date    Colon Cancer Screening: na  PSA: na      Immunization History   Administered Date(s) Administered    COVID-19 (PFIZER) Purple Cap Monovalent 04/10/2021, 05/02/2021, 01/09/2022    DTaP 2002, 2002, 04/17/2003, 03/02/2004, 08/31/2006    Flu Vaccine Intradermal Quad 18-64YR 01/10/2019    Flu Vaccine Split Quad 01/10/2019    Fluzone (or Fluarix & Flulaval for VFC) >6mos 10/15/2021, 01/19/2023    HPV Bivalent 11/09/2016, 01/31/2017    Hep A, 2 Dose 07/30/2018    Hepatitis A 01/30/2018, 07/30/2018    Hepatitis B Adult/Adolescent IM 2002, 2002, 04/18/2003    HiB 2002, 2002, 04/18/2003, 03/02/2004    IPV 2002, 2002, 04/18/2003, 08/31/2006    Influenza Injectable Mdck Pf Quad 01/19/2023    Influenza MDCK Quadrivalent with Preserative 01/10/2019    MMR 03/02/2004, 08/31/2006    Meningococcal MCV4P (Menactra) 07/30/2018    Meningococcal Polysaccharide 07/30/2018    Pneumococcal Conjugate 13-Valent (PCV13) 2002, 04/18/2003, 07/18/2003, 03/02/2004    Tdap 04/03/2024    Varicella 07/18/2003, 10/01/2010       The following portions of the patient's history were reviewed and updated as appropriate: allergies, current medications, past family history, past medical history, past social history, past surgical history and problem list.    Past Medical History:   Diagnosis Date    Acne     Acute upper respiratory infection     ADHD (attention deficit hyperactivity disorder)     Allergic rhinitis     Asthma     Attention deficit disorder without hyperactivity     Cough     Esophageal reflux     Fever     Long-term use of high-risk medication     Need for hepatitis A immunization     Need for influenza vaccination     Need for meningococcal vaccination     Need for prophylactic vaccination against human papillomavirus     Rhinorrhea     Sore throat     Sports physical     Status asthmaticus      Wheezing        Past Surgical History:   Procedure Laterality Date    KNEE ARTHROSCOPY Left     KNEE SURGERY         Family History   Problem Relation Age of Onset    Asthma Mother     Allergic rhinitis Mother     Allergic rhinitis Father     Irritable bowel syndrome Sister     No Known Problems Other     Colon cancer Neg Hx     Colon polyps Neg Hx     Crohn's disease Neg Hx     Ulcerative colitis Neg Hx        Social History     Socioeconomic History    Marital status: Single   Tobacco Use    Smoking status: Never     Passive exposure: Never    Smokeless tobacco: Never   Vaping Use    Vaping status: Never Used   Substance and Sexual Activity    Alcohol use: No    Drug use: No    Sexual activity: Yes     Partners: Female         Current Outpatient Medications:     albuterol sulfate  (90 Base) MCG/ACT inhaler, Inhale 2 puffs Every 4 (Four) Hours As Needed for Wheezing., Disp: 18 g, Rfl: 5    cetirizine (zyrTEC) 10 MG tablet, Take 1 tablet by mouth Daily., Disp: , Rfl:     Review of Systems    Objective   Vitals:    04/07/25 1438   BP: 106/60   Pulse: 84   Temp: 97.9 °F (36.6 °C)   SpO2: 99%     Body mass index is 26.23 kg/m².  Physical Exam  Vitals and nursing note reviewed.   Constitutional:       General: He is not in acute distress.     Appearance: Normal appearance. He is well-developed.   HENT:      Head: Normocephalic and atraumatic.      Right Ear: Tympanic membrane, ear canal and external ear normal.      Left Ear: Tympanic membrane, ear canal and external ear normal.      Nose: Nose normal.      Mouth/Throat:      Mouth: Mucous membranes are moist.      Pharynx: Oropharynx is clear.   Eyes:      Conjunctiva/sclera: Conjunctivae normal.      Pupils: Pupils are equal, round, and reactive to light.   Neck:      Thyroid: No thyromegaly.   Cardiovascular:      Rate and Rhythm: Normal rate and regular rhythm.      Heart sounds: No murmur heard.  Pulmonary:      Effort: Pulmonary effort is normal.       Breath sounds: Normal breath sounds.   Abdominal:      General: Abdomen is flat. Bowel sounds are normal. There is no distension.      Palpations: Abdomen is soft. There is no mass.   Musculoskeletal:         General: No swelling or tenderness. Normal range of motion.      Cervical back: Neck supple.   Skin:     General: Skin is warm and dry.      Capillary Refill: Capillary refill takes less than 2 seconds.   Neurological:      Mental Status: He is alert and oriented to person, place, and time.   Psychiatric:         Mood and Affect: Mood normal.         Behavior: Behavior normal.       Physical Exam         Results         Assessment & Plan   Diagnoses and all orders for this visit:    1. Healthcare maintenance (Primary)    2. Seasonal allergic rhinitis due to pollen    3. Eosinophilic esophagitis    4. Mild intermittent asthma, unspecified whether complicated  -     albuterol sulfate  (90 Base) MCG/ACT inhaler; Inhale 2 puffs Every 4 (Four) Hours As Needed for Wheezing.  Dispense: 18 g; Refill: 5    5. Screening for STD (sexually transmitted disease)  -     Chlamydia trachomatis, Neisseria gonorrhoeae, PCR - Urine, Urine, Clean Catch      Assessment & Plan  1. Eosinophilic Esophagitis.  He reports significant improvement in symptoms after taking a proton pump inhibitor for 8 weeks and making dietary changes. He will continue dietary modifications and physical activity. If symptoms recur, he will start taking a proton pump inhibitor and consult an allergist for food allergy testing.    2. Asthma.  His asthma is well-controlled, with infrequent use of his inhaler. He will continue using his inhaler and over-the-counter allergy medications as needed. A refill for albuterol will be provided as his current supply expires at the end of this month.    3. Acne.  He is experiencing acne breakouts on his back due to sweating at work. He was reassured that it is hormonal and will resolve by late 20s. He is advised to  use Neutrogena acne body wash on the affected areas, scrubbing gently.    4. ADD.  He does not wish to resume medication for ADD as it has not made a significant difference for him.    5. Health Maintenance.  He declined the pneumonia vaccine. STD screening will be conducted.            Discussed the importance of maintaining a healthy weight and getting regular exercise.  Educated patient on the benefits of healthy diet.  Advise follow-up annually for wellness exams.    There are no Patient Instructions on file for this visit.    Patient or patient representative verbalized consent for the use of Ambient Listening during the visit with  Meredith Lea Kehrer, MD for chart documentation. 4/7/2025  14:53 EDT

## 2025-04-09 LAB
C TRACH RRNA SPEC QL NAA+PROBE: NEGATIVE
N GONORRHOEA RRNA SPEC QL NAA+PROBE: NEGATIVE